# Patient Record
Sex: FEMALE | Race: BLACK OR AFRICAN AMERICAN | NOT HISPANIC OR LATINO | Employment: FULL TIME | URBAN - METROPOLITAN AREA
[De-identification: names, ages, dates, MRNs, and addresses within clinical notes are randomized per-mention and may not be internally consistent; named-entity substitution may affect disease eponyms.]

---

## 2024-02-13 DIAGNOSIS — J45.901 ASTHMA WITH ACUTE EXACERBATION, UNSPECIFIED ASTHMA SEVERITY, UNSPECIFIED WHETHER PERSISTENT (HHS-HCC): Primary | ICD-10-CM

## 2024-02-13 PROBLEM — R22.30 MASS OF FOREARM: Status: RESOLVED | Noted: 2024-02-13 | Resolved: 2024-02-13

## 2024-02-13 PROBLEM — R09.82 POSTNASAL DRIP: Status: RESOLVED | Noted: 2024-02-13 | Resolved: 2024-02-13

## 2024-02-13 PROBLEM — R92.8 ABNORMAL MAMMOGRAM: Status: RESOLVED | Noted: 2024-02-13 | Resolved: 2024-02-13

## 2024-02-13 PROBLEM — E66.9 OBESITY: Status: ACTIVE | Noted: 2024-02-13

## 2024-02-13 PROBLEM — R42 VERTIGO: Status: ACTIVE | Noted: 2024-02-13

## 2024-02-13 PROBLEM — M25.561 RIGHT KNEE PAIN: Status: RESOLVED | Noted: 2024-02-13 | Resolved: 2024-02-13

## 2024-02-13 PROBLEM — R07.89 ANTERIOR CHEST WALL PAIN: Status: RESOLVED | Noted: 2024-02-13 | Resolved: 2024-02-13

## 2024-02-13 PROBLEM — R31.9 HEMATURIA: Status: RESOLVED | Noted: 2024-02-13 | Resolved: 2024-02-13

## 2024-02-13 PROBLEM — E55.9 VITAMIN D DEFICIENCY: Status: ACTIVE | Noted: 2024-02-13

## 2024-02-13 PROBLEM — M81.0 AGE RELATED OSTEOPOROSIS: Status: ACTIVE | Noted: 2024-02-13

## 2024-02-13 PROBLEM — R07.1 CHEST PAIN VARYING WITH BREATHING: Status: RESOLVED | Noted: 2024-02-13 | Resolved: 2024-02-13

## 2024-02-13 PROBLEM — R31.29 MICROSCOPIC HEMATURIA: Status: RESOLVED | Noted: 2024-02-13 | Resolved: 2024-02-13

## 2024-02-13 PROBLEM — E78.00 HYPERCHOLESTEREMIA: Status: ACTIVE | Noted: 2024-02-13

## 2024-02-13 RX ORDER — ALBUTEROL SULFATE 90 UG/1
2 AEROSOL, METERED RESPIRATORY (INHALATION) EVERY 6 HOURS PRN
COMMUNITY
End: 2024-02-13 | Stop reason: SDUPTHER

## 2024-02-13 RX ORDER — BUDESONIDE AND FORMOTEROL FUMARATE DIHYDRATE 160; 4.5 UG/1; UG/1
1 AEROSOL RESPIRATORY (INHALATION) 2 TIMES DAILY
COMMUNITY

## 2024-02-13 RX ORDER — ALBUTEROL SULFATE 90 UG/1
2 AEROSOL, METERED RESPIRATORY (INHALATION) EVERY 6 HOURS PRN
Qty: 18 G | Refills: 2 | Status: SHIPPED | OUTPATIENT
Start: 2024-02-13

## 2024-02-23 ENCOUNTER — OFFICE VISIT (OUTPATIENT)
Dept: PRIMARY CARE | Facility: CLINIC | Age: 69
End: 2024-02-23
Payer: COMMERCIAL

## 2024-02-23 VITALS
HEIGHT: 59 IN | HEART RATE: 72 BPM | WEIGHT: 176 LBS | BODY MASS INDEX: 35.48 KG/M2 | SYSTOLIC BLOOD PRESSURE: 120 MMHG | DIASTOLIC BLOOD PRESSURE: 70 MMHG | RESPIRATION RATE: 16 BRPM

## 2024-02-23 DIAGNOSIS — J98.01 COUGH DUE TO BRONCHOSPASM: Primary | ICD-10-CM

## 2024-02-23 DIAGNOSIS — J45.901 MODERATE ASTHMA WITH ACUTE EXACERBATION, UNSPECIFIED WHETHER PERSISTENT (HHS-HCC): ICD-10-CM

## 2024-02-23 PROCEDURE — 99213 OFFICE O/P EST LOW 20 MIN: CPT | Performed by: INTERNAL MEDICINE

## 2024-02-23 PROCEDURE — 1036F TOBACCO NON-USER: CPT | Performed by: INTERNAL MEDICINE

## 2024-02-23 PROCEDURE — 1159F MED LIST DOCD IN RCRD: CPT | Performed by: INTERNAL MEDICINE

## 2024-02-23 RX ORDER — BENZONATATE 200 MG/1
200 CAPSULE ORAL 3 TIMES DAILY PRN
Qty: 42 CAPSULE | Refills: 0 | Status: SHIPPED | OUTPATIENT
Start: 2024-02-23 | End: 2024-03-24

## 2024-02-23 RX ORDER — PREDNISONE 10 MG/1
TABLET ORAL
Qty: 30 TABLET | Refills: 0 | Status: SHIPPED | OUTPATIENT
Start: 2024-02-23 | End: 2024-03-03

## 2024-02-23 RX ORDER — DOXYCYCLINE 100 MG/1
100 CAPSULE ORAL 2 TIMES DAILY
Qty: 28 CAPSULE | Refills: 0 | Status: SHIPPED | OUTPATIENT
Start: 2024-02-23 | End: 2024-03-08

## 2024-02-23 ASSESSMENT — ENCOUNTER SYMPTOMS
GASTROINTESTINAL NEGATIVE: 1
ENDOCRINE NEGATIVE: 1
CARDIOVASCULAR NEGATIVE: 1
EYES NEGATIVE: 1
COUGH: 1
NEUROLOGICAL NEGATIVE: 1
CONSTITUTIONAL NEGATIVE: 1
MUSCULOSKELETAL NEGATIVE: 1
PSYCHIATRIC NEGATIVE: 1
HEMATOLOGIC/LYMPHATIC NEGATIVE: 1
ALLERGIC/IMMUNOLOGIC NEGATIVE: 1

## 2024-02-23 NOTE — ASSESSMENT & PLAN NOTE
Bronchitis with wheezing                                           Recommend:                                                                                                      mild  Pharyngitis,                                                                                                                                                                               Start:   Doxycycline 100 mg BID for 14 days  -                                                                                                                                                             Asthma/COPD exacerbation       Allegra 180mg qD vs. Claritin 10 mg qD and Mucinex                                                                                                                                              Cough  - start Prednisone 10 mg II BID for 3 days and II qD for 3 days and I qD for 3 days -  Avoid cold exposure and take vitamins C 500 and B complex  qD   and start startDoxycycline 100 mg BID for 14 days   and Allegra 180 mg daily and Flonase I puff BID and avoids cold exposure      Sinusitis - allergic vs. infectious - start Allegra and Flonase I BID and Doxycycline 100 mg BID for 14 days  and avoid cold exposure.

## 2024-02-23 NOTE — PROGRESS NOTES
"Subjective   Patient ID: Aurea Hall is a 68 y.o. female who presents for Cough (Negative covid, dry cough).- cough for one month and she is negative for Covid  - dry cough  -     Cough         Review of Systems   Constitutional: Negative.    HENT: Negative.     Eyes: Negative.    Respiratory:  Positive for cough.    Cardiovascular: Negative.    Gastrointestinal: Negative.    Endocrine: Negative.    Musculoskeletal: Negative.    Skin: Negative.    Allergic/Immunologic: Negative.    Neurological: Negative.    Hematological: Negative.    Psychiatric/Behavioral: Negative.     All other systems reviewed and are negative.      Objective   Ht 1.499 m (4' 11\")   Wt 79.8 kg (176 lb)   BMI 35.55 kg/m²   Blood pressure 120/70, pulse 72, resp. rate 16, height 1.499 m (4' 11\"), weight 79.8 kg (176 lb).   Physical Exam  Vitals and nursing note reviewed.   Constitutional:       Appearance: Normal appearance.   HENT:      Head: Normocephalic and atraumatic.      Right Ear: Tympanic membrane, ear canal and external ear normal.      Left Ear: Tympanic membrane, ear canal and external ear normal. There is no impacted cerumen.      Nose: Nose normal.      Mouth/Throat:      Mouth: Mucous membranes are moist.      Pharynx: Oropharynx is clear.   Eyes:      Extraocular Movements: Extraocular movements intact.      Conjunctiva/sclera: Conjunctivae normal.      Pupils: Pupils are equal, round, and reactive to light.   Cardiovascular:      Rate and Rhythm: Normal rate and regular rhythm.      Pulses: Normal pulses.      Heart sounds: Normal heart sounds. No murmur heard.  Pulmonary:      Effort: Pulmonary effort is normal. No respiratory distress.      Breath sounds: No stridor. Wheezing and rhonchi present. No rales.   Chest:      Chest wall: No tenderness.   Abdominal:      General: Abdomen is flat. Bowel sounds are normal. There is no distension.      Palpations: Abdomen is soft. There is no mass.      Tenderness: There is no " abdominal tenderness. There is no right CVA tenderness, left CVA tenderness, guarding or rebound.      Hernia: No hernia is present.   Musculoskeletal:         General: Normal range of motion.      Cervical back: Normal range of motion and neck supple.   Skin:     General: Skin is warm.      Capillary Refill: Capillary refill takes less than 2 seconds.   Neurological:      General: No focal deficit present.      Mental Status: She is alert.      Cranial Nerves: No cranial nerve deficit.      Sensory: No sensory deficit.      Motor: No weakness.      Coordination: Coordination normal.      Gait: Gait normal.      Deep Tendon Reflexes: Reflexes normal.   Psychiatric:         Mood and Affect: Mood normal.         Behavior: Behavior normal. Behavior is cooperative.         Thought Content: Thought content normal.         Judgment: Judgment normal.         Assessment/Plan   Problem List Items Addressed This Visit             ICD-10-CM    Asthma with exacerbation J45.901     Bronchitis with wheezing                                           Recommend:                                                                                                      mild  Pharyngitis,                                                                                                                                                                               Start:   Doxycycline 100 mg BID for 14 days  -                                                                                                                                                             Asthma/COPD exacerbation       Allegra 180mg qD vs. Claritin 10 mg qD and Mucinex                                                                                                                                             Cough  - start Prednisone 10 mg II BID for 3 days and II qD for 3 days and I qD for 3 days -  Avoid cold exposure and take vitamins C 500 and B complex  qD    and start startDoxycycline 100 mg BID for 14 days   and Allegra 180 mg daily and Flonase I puff BID and avoids cold exposure     Sinusitis - allergic vs. infectious - start Allegra and Flonase I BID and Doxycycline 100 mg BID for 14 days  and avoid cold exposure.           Relevant Medications    doxycycline (Vibramycin) 100 mg capsule    predniSONE (Deltasone) 10 mg tablet    benzonatate (Tessalon) 200 mg capsule    Cough due to bronchospasm - Primary J98.01     Bronchitis with wheezing                                           Recommend:                                                                                                      mild  Pharyngitis,                                                                                                                                                                               Start:   Doxycycline 100 mg BID for 14 days  -                                                                                                                                                             Asthma/COPD exacerbation       Allegra 180mg qD vs. Claritin 10 mg qD and Mucinex                                                                                                                                              Cough  - start Prednisone 10 mg II BID for 3 days and II qD for 3 days and I qD for 3 days -  Avoid cold exposure and take vitamins C 500 and B complex  qD   and start startDoxycycline 100 mg BID for 14 days   and Allegra 180 mg daily and Flonase I puff BID and avoids cold exposure      Sinusitis - allergic vs. infectious - start Allegra and Flonase I BID and Doxycycline 100 mg BID for 14 days  and avoid cold exposure.           Relevant Medications    doxycycline (Vibramycin) 100 mg capsule    predniSONE (Deltasone) 10 mg tablet    benzonatate (Tessalon) 200 mg capsule

## 2024-02-29 ENCOUNTER — TELEPHONE (OUTPATIENT)
Dept: PRIMARY CARE | Facility: CLINIC | Age: 69
End: 2024-02-29

## 2024-02-29 ENCOUNTER — APPOINTMENT (OUTPATIENT)
Dept: PRIMARY CARE | Facility: CLINIC | Age: 69
End: 2024-02-29
Payer: COMMERCIAL

## 2024-02-29 PROBLEM — K11.20 PAROTITIS: Status: RESOLVED | Noted: 2024-02-29 | Resolved: 2024-02-29

## 2024-02-29 PROBLEM — I10 PRIMARY HYPERTENSION: Status: RESOLVED | Noted: 2024-02-29 | Resolved: 2024-02-29

## 2024-02-29 PROBLEM — U07.1 SEVERE ACUTE RESPIRATORY SYNDROME CORONAVIRUS 2 (SARS-COV-2) DETECTED: Status: RESOLVED | Noted: 2024-02-29 | Resolved: 2024-02-29

## 2024-03-01 ENCOUNTER — APPOINTMENT (OUTPATIENT)
Dept: PRIMARY CARE | Facility: CLINIC | Age: 69
End: 2024-03-01
Payer: COMMERCIAL

## 2024-03-13 ENCOUNTER — HOSPITAL ENCOUNTER (OUTPATIENT)
Dept: RADIOLOGY | Facility: HOSPITAL | Age: 69
Discharge: HOME | End: 2024-03-13
Payer: COMMERCIAL

## 2024-03-13 DIAGNOSIS — J98.01 COUGH DUE TO BRONCHOSPASM: ICD-10-CM

## 2024-03-13 PROCEDURE — 71046 X-RAY EXAM CHEST 2 VIEWS: CPT | Performed by: RADIOLOGY

## 2024-03-13 PROCEDURE — 71046 X-RAY EXAM CHEST 2 VIEWS: CPT

## 2024-03-15 ENCOUNTER — TELEPHONE (OUTPATIENT)
Dept: PRIMARY CARE | Facility: CLINIC | Age: 69
End: 2024-03-15
Payer: COMMERCIAL

## 2024-04-01 ENCOUNTER — OFFICE VISIT (OUTPATIENT)
Dept: PRIMARY CARE | Facility: CLINIC | Age: 69
End: 2024-04-01
Payer: COMMERCIAL

## 2024-04-01 VITALS
DIASTOLIC BLOOD PRESSURE: 83 MMHG | WEIGHT: 166.12 LBS | SYSTOLIC BLOOD PRESSURE: 149 MMHG | RESPIRATION RATE: 16 BRPM | HEART RATE: 80 BPM | TEMPERATURE: 97 F | BODY MASS INDEX: 33.49 KG/M2 | HEIGHT: 59 IN | OXYGEN SATURATION: 97 %

## 2024-04-01 DIAGNOSIS — Z01.00 EYE EXAM, ROUTINE: ICD-10-CM

## 2024-04-01 DIAGNOSIS — R09.82 POSTNASAL DRIP: ICD-10-CM

## 2024-04-01 DIAGNOSIS — I10 PRIMARY HYPERTENSION: ICD-10-CM

## 2024-04-01 DIAGNOSIS — Z00.00 ENCOUNTER FOR ANNUAL PHYSICAL EXAM: Primary | ICD-10-CM

## 2024-04-01 DIAGNOSIS — I10 PRIMARY HYPERTENSION: Primary | ICD-10-CM

## 2024-04-01 DIAGNOSIS — Z78.0 POSTMENOPAUSAL: ICD-10-CM

## 2024-04-01 DIAGNOSIS — Z12.31 OTHER SCREENING MAMMOGRAM: ICD-10-CM

## 2024-04-01 DIAGNOSIS — Z12.11 ENCOUNTER FOR SCREENING COLONOSCOPY: ICD-10-CM

## 2024-04-01 PROCEDURE — 1159F MED LIST DOCD IN RCRD: CPT | Performed by: INTERNAL MEDICINE

## 2024-04-01 PROCEDURE — 1036F TOBACCO NON-USER: CPT | Performed by: INTERNAL MEDICINE

## 2024-04-01 PROCEDURE — 3079F DIAST BP 80-89 MM HG: CPT | Performed by: INTERNAL MEDICINE

## 2024-04-01 PROCEDURE — 3077F SYST BP >= 140 MM HG: CPT | Performed by: INTERNAL MEDICINE

## 2024-04-01 PROCEDURE — 99397 PER PM REEVAL EST PAT 65+ YR: CPT | Performed by: INTERNAL MEDICINE

## 2024-04-01 PROCEDURE — 1125F AMNT PAIN NOTED PAIN PRSNT: CPT | Performed by: INTERNAL MEDICINE

## 2024-04-01 RX ORDER — AMLODIPINE BESYLATE 5 MG/1
5 TABLET ORAL DAILY
Qty: 90 TABLET | Refills: 1 | Status: SHIPPED | OUTPATIENT
Start: 2024-04-01

## 2024-04-01 RX ORDER — FLUTICASONE PROPIONATE 50 MCG
1 SPRAY, SUSPENSION (ML) NASAL DAILY
Qty: 16 G | Refills: 11 | Status: SHIPPED | OUTPATIENT
Start: 2024-04-01 | End: 2025-04-01

## 2024-04-01 RX ORDER — AMLODIPINE BESYLATE 5 MG/1
5 TABLET ORAL DAILY
Qty: 30 TABLET | Refills: 5 | Status: SHIPPED | OUTPATIENT
Start: 2024-04-01 | End: 2024-04-01

## 2024-04-01 ASSESSMENT — PAIN SCALES - GENERAL: PAINLEVEL: 6

## 2024-04-01 ASSESSMENT — ENCOUNTER SYMPTOMS: COUGH: 1

## 2024-04-01 NOTE — ASSESSMENT & PLAN NOTE
Declines Prevnar 20 and Adacel vaccine.  Recommend Shingrix vaccine.  Have  screening mammogram.  Have  bone density scan.  Schedule screening colonoscopy.  Have fasting blood work.  She ophthalmologist in consult for eyes exam.

## 2024-04-01 NOTE — PROGRESS NOTES
"Subjective   Patient ID: Aurea Hall is a 68 y.o. female who presents for Knee Pain and Annual Exam.    Annual physical exam.  She has asthma, C/O coughing in the morning, Takes Singulair ,uses inhaler Symbicort. Post nasal drip.  High blood pressure today. Does not follow low sodium diet.  BMI 33.    Knee Pain          Review of Systems   Respiratory:  Positive for cough.    All other systems reviewed and are negative.      Objective   /83 (BP Location: Left arm, Patient Position: Sitting)   Pulse 80   Temp 36.1 °C (97 °F) (Temporal)   Resp 16   Ht 1.49 m (4' 10.66\")   Wt 75.4 kg (166 lb 1.9 oz)   SpO2 97%   BMI 33.94 kg/m²     Physical Exam  Constitutional:       Appearance: Normal appearance. She is obese.   HENT:      Head: Normocephalic and atraumatic.      Right Ear: External ear normal.      Left Ear: External ear normal.      Mouth/Throat:      Mouth: Mucous membranes are moist.      Pharynx: Oropharynx is clear.   Neck:      Vascular: No carotid bruit.   Cardiovascular:      Rate and Rhythm: Normal rate and regular rhythm.      Heart sounds: No murmur heard.     No gallop.   Pulmonary:      Effort: Pulmonary effort is normal. No respiratory distress.      Breath sounds: No wheezing or rales.   Chest:   Breasts:     Right: Normal.      Left: Normal.   Abdominal:      General: Abdomen is flat.      Palpations: Abdomen is soft.      Hernia: No hernia is present.   Musculoskeletal:         General: No swelling, tenderness, deformity or signs of injury.      Cervical back: No rigidity or tenderness.      Right lower leg: No edema.   Lymphadenopathy:      Cervical: No cervical adenopathy.   Skin:     Coloration: Skin is not jaundiced or pale.      Findings: No bruising, erythema, lesion or rash.   Neurological:      General: No focal deficit present.      Mental Status: She is oriented to person, place, and time.      Motor: No weakness.      Coordination: Coordination normal.   Psychiatric:    "      Mood and Affect: Mood normal.         Behavior: Behavior normal.         Assessment/Plan   Problem List Items Addressed This Visit             ICD-10-CM    Postnasal drip R09.82     Use fluticasone nasal spray 1 spray each nostril at bedtime.  May take Allegra or Claritin at bedtime         Relevant Medications    fluticasone (Flonase) 50 mcg/actuation nasal spray    Primary hypertension I10     Advise low-sodium diet do not exceed 200 mg per serving.  Start amlodipine 5 mg daily.         Relevant Medications    amLODIPine (Norvasc) 5 mg tablet    Encounter for annual physical exam - Primary Z00.00     Declines Prevnar 20 and Adacel vaccine.  Her screening mammogram.  Her bone density scan.  Schedule screening colonoscopy.  Her fasting blood work.  She ophthalmologist in consult for eyes exam.          Other Visit Diagnoses         Codes    Eye exam, routine     Z01.00    Relevant Orders    Referral to Ophthalmology    Other screening mammogram     Z12.31    Relevant Orders    BI mammo bilateral screening tomosynthesis    Postmenopausal     Z78.0    Relevant Orders    XR DEXA bone density    Encounter for screening colonoscopy     Z12.11    Relevant Orders    Colonoscopy Screening; Average Risk Patient

## 2024-04-01 NOTE — ASSESSMENT & PLAN NOTE
Use fluticasone nasal spray 1 spray each nostril at bedtime.  May take Allegra or Claritin at bedtime

## 2024-04-11 ENCOUNTER — HOSPITAL ENCOUNTER (OUTPATIENT)
Dept: RADIOLOGY | Facility: CLINIC | Age: 69
Discharge: HOME | End: 2024-04-11
Payer: COMMERCIAL

## 2024-04-11 VITALS — HEIGHT: 59 IN | WEIGHT: 166.23 LBS | BODY MASS INDEX: 33.51 KG/M2

## 2024-04-11 DIAGNOSIS — Z12.31 OTHER SCREENING MAMMOGRAM: ICD-10-CM

## 2024-04-11 PROCEDURE — 77067 SCR MAMMO BI INCL CAD: CPT

## 2024-04-11 PROCEDURE — 77063 BREAST TOMOSYNTHESIS BI: CPT | Performed by: STUDENT IN AN ORGANIZED HEALTH CARE EDUCATION/TRAINING PROGRAM

## 2024-04-11 PROCEDURE — 77067 SCR MAMMO BI INCL CAD: CPT | Performed by: STUDENT IN AN ORGANIZED HEALTH CARE EDUCATION/TRAINING PROGRAM

## 2024-04-16 DIAGNOSIS — R92.8 ABNORMAL MAMMOGRAM OF LEFT BREAST: Primary | ICD-10-CM

## 2024-04-17 ENCOUNTER — TELEPHONE (OUTPATIENT)
Dept: PRIMARY CARE | Facility: CLINIC | Age: 69
End: 2024-04-17
Payer: COMMERCIAL

## 2024-04-18 ENCOUNTER — PROCEDURE VISIT (OUTPATIENT)
Dept: SURGICAL ONCOLOGY | Facility: CLINIC | Age: 69
End: 2024-04-18
Payer: COMMERCIAL

## 2024-04-18 ENCOUNTER — HOSPITAL ENCOUNTER (OUTPATIENT)
Dept: RADIOLOGY | Facility: CLINIC | Age: 69
Discharge: HOME | End: 2024-04-18
Payer: COMMERCIAL

## 2024-04-18 VITALS
DIASTOLIC BLOOD PRESSURE: 88 MMHG | BODY MASS INDEX: 32.79 KG/M2 | HEIGHT: 60 IN | WEIGHT: 167 LBS | SYSTOLIC BLOOD PRESSURE: 138 MMHG | HEART RATE: 67 BPM

## 2024-04-18 DIAGNOSIS — R92.8 ABNORMAL FINDING ON BREAST IMAGING: Primary | ICD-10-CM

## 2024-04-18 DIAGNOSIS — R92.8 ABNORMAL MAMMOGRAM OF LEFT BREAST: ICD-10-CM

## 2024-04-18 DIAGNOSIS — N63.20 MASS OF LEFT BREAST, UNSPECIFIED QUADRANT: ICD-10-CM

## 2024-04-18 PROCEDURE — 99203 OFFICE O/P NEW LOW 30 MIN: CPT

## 2024-04-18 PROCEDURE — 76642 ULTRASOUND BREAST LIMITED: CPT | Mod: LT

## 2024-04-18 PROCEDURE — 99213 OFFICE O/P EST LOW 20 MIN: CPT

## 2024-04-18 PROCEDURE — 76642 ULTRASOUND BREAST LIMITED: CPT | Mod: LEFT SIDE | Performed by: STUDENT IN AN ORGANIZED HEALTH CARE EDUCATION/TRAINING PROGRAM

## 2024-04-18 PROCEDURE — 76982 USE 1ST TARGET LESION: CPT | Mod: LT

## 2024-04-18 ASSESSMENT — PAIN SCALES - GENERAL: PAINLEVEL: 0-NO PAIN

## 2024-04-18 NOTE — PATIENT INSTRUCTIONS
I recommend a left breast ultrasound guided biopsy. A breast radiology physician will perform the biopsy. Possible diagnoses include benign, atypical, or cancer as we discussed.  Bruising and mild discomfort after the biopsy is normal and will improve. I normally have results in 5-7 business days. I will call you with results, please have your phone handy to take my call.    IMPORTANT INFORMATION REGARDING YOUR RESULTS    If you receive medical information from My [facility] Personal Health Record (online chart) your results will be released into your chart. This means you may view or see results of your biopsy or procedure before I contact you directly. If this occurs, please call the office and we will discuss your results over the phone.    You can see your health information, review clinical summaries from office visits & test results online when you follow your health with MY  Chart, a personal health record. To sign up go to [email or web address]. If you need assistance with signing up or trouble getting into your account call [facility] Patient Line 24/7 at [phone].    My office phone number is [phone] if you need to get in touch with me or have additional questions or concerns. Thank you for choosing [facility] and trusting me as your healthcare provider. I look forward to seeing you again at your next office visit. I am honored to be a provider on your health care team and I remain dedicated to helping you achieve your health goals.Proceed to biopsy. A breast radiology physician will perform the biopsy. Results are usually available in about 7 business days. I will call patient with results and instruct on next steps and plan.

## 2024-04-18 NOTE — PROGRESS NOTES
[facility]  Aurea Hall female   1955 68 y.o.  [id number]      Chief Complaint  New patient, biopsy consultation.    History Of Present Illness  Aurea Hall is a 68 y.o. female seen in the breast center for biopsy consultation. She denies breast surgery or biopsy. She has family history of breast cancer in paternal grandmother    BREAST IMAGIN2024 Bilateral screening mammogram, BI-RADS Category 0, Left breast masses. 2024 Left breast ultrasound, BI-RADS Category 4, 3:00 8 cm from the nipple, 1.5 x 2.0 x 1.0 cm, heterogeneous oval parallel circumscribed conglomerate masses, left axilla demonstrates 4  morphologically normal lymph nodes, warranting ultrasound guided biopsy.     REPRODUCTIVE HISTORY: menarche age 9, , no OCP's, menopause age unknown s/p KI in  due to fibroids, heterogeneously dense     FAMILY CANCER HISTORY:   Father: Colon cancer, age 68  Paternal Grandmother: breast cancer, age 66  Brother: unknown primary     Review of Systems  Constitutional:  Negative for appetite change, fatigue, fever and unexpected weight change.   HENT:  Negative for ear pain, hearing loss, nosebleeds, sore throat and trouble swallowing.    Eyes:  Negative for discharge, itching and visual disturbance.   Breast: As stated in HPI.  Respiratory:  Negative for cough, chest tightness and shortness of breath.    Cardiovascular:  Negative for chest pain, palpitations and leg swelling.   Gastrointestinal:  Negative for abdominal pain, constipation, diarrhea and nausea.   Endocrine: Negative for cold intolerance and heat intolerance.   Genitourinary:  Negative for dysuria, frequency, hematuria, pelvic pain and vaginal bleeding.   Musculoskeletal:  Negative for arthralgias, back pain, gait problem, joint swelling and myalgias.   Skin:  Negative for color change and rash.   Allergic/Immunologic: Negative for environmental allergies and food allergies.   Neurological:  Negative  for dizziness, tremors, speech difficulty, weakness, numbness and headaches.   Hematological:  Does not bruise/bleed easily.   Psychiatric/Behavioral:  Negative for agitation, dysphoric mood and sleep disturbance. The patient is not nervous/anxious.       Past Medical History  She has a past medical history of Abnormal mammogram (02/13/2024), Anterior chest wall pain (02/13/2024), Mass of forearm (02/13/2024), Microscopic hematuria (02/13/2024), Parotitis (02/29/2024), Personal history of other diseases of urinary system (07/22/2014), Postnasal drip (02/13/2024), Primary hypertension (02/29/2024), Right knee pain (02/13/2024), Severe acute respiratory syndrome coronavirus 2 (SARS-CoV-2) detected (02/29/2024), and Unspecified asthma, uncomplicated (HHS-HCC) (04/07/2020).    Surgical History  She has a past surgical history that includes Hysterectomy (03/11/2014).    Family History  Cancer-related family history includes Breast cancer (age of onset: 60) in her paternal grandmother.     Social History  She reports that she has quit smoking. Her smoking use included cigarettes. She has never used smokeless tobacco. She reports that she does not currently use alcohol. She reports that she does not use drugs.    Allergies  Doxycycline, Erythromycin base, and Sulfamethoxazole-trimethoprim    Medications  Current Outpatient Medications   Medication Instructions    albuterol 90 mcg/actuation inhaler 2 puffs, inhalation, Every 6 hours PRN    amLODIPine (NORVASC) 5 mg, oral, Daily    budesonide-formoteroL (Symbicort) 160-4.5 mcg/actuation inhaler 1 puff, inhalation, 2 times daily    fluticasone (Flonase) 50 mcg/actuation nasal spray 1 spray, Each Nostril, Daily, Shake gently. Before first use, prime pump. After use, clean tip and replace cap.    montelukast (SINGULAIR) 10 mg, oral, Daily       Last Recorded Vitals  There were no vitals filed for this visit.    Physical Exam  Physical Exam  Patient is alert and oriented x3 and  in a relaxed and appropriate mood. Her gait is steady and hand grasps are equal. Sclera is clear. The breasts are nearly symmetrical. The left breast 3:00 8 cm from the nipple,  1.5 x 1.5 cm mass. The tissue is soft without palpable abnormalities, discrete nodules or masses. The skin and nipples appear normal. There is no cervical, supraclavicular or axillary lymphadenopathy. Heart rate and rhythm normal, S1 and S2 appreciated. The lungs are clear to auscultation bilaterally. Abdomen is soft and non-tender.      Relevant Results and Imaging      BI US breast limited left 04/18/2024    Narrative  Interpreted By:  Sami Arrieta,  STUDY:  BI US BREAST LIMITED LEFT;  4/18/2024 9:06 am    ACCESSION NUMBER(S):  [id number]    ORDERING CLINICIAN:  JLUIS BALLARD    INDICATION:  Recall from screening for slowly growing left breast masses.    COMPARISON:  04/11/2024, 11/19/2021, 05/19/2017    FINDINGS:  Targeted ultrasound was performed of the left breast by a registered  sonographer with elastography. In the left breast at 3 o'clock, 8 cm  from the nipple, there is a heterogeneous oval parallel circumscribed  conglomerate masses measuring 1.5 x 2.0 x 1.0 cm. This contains some  internal flow and is intermediate on elastography. Given the slow  interval growth on mammography, biopsy is recommended for further  evaluation. Scanning of the left axilla demonstrates 4  morphologically normal lymph nodes.    Impression  Indeterminate conglomerate of left breast masses. Further evaluation  with surgical consultation and ultrasound-guided biopsy is  recommended. Dr. Sami Arrieta explained the findings and  recommendations to the patient at the time of exam. A message was  sent to the referring practitioner at the time of this dictation  regarding these findings using the epic critical findings reporting  system. A pre-procedure form was filled out.    Method of Detection: Category Sdbt - 3D Screening    BI-RADS CATEGORY:    BI-RADS  Category:  4 Suspicious.  Recommendation:  Surgical Consultation and Biopsy.  Recommended Date:  Immediate.  Laterality:  Left.      I explained the results in depth, along with suggested explanation for follow up recommendations based on the testing results. BI-RADS Category 4    Visit Diagnosis  No diagnosis found.      Assessment/Plan    Proceed to biopsy. A breast radiology physician will perform the biopsy. Results are usually available in about 7 business days. I will call patient with results and instruct on next steps and plan.        Patient Discussion/Summary   I recommend a left breast ultrasound guided biopsy. A breast radiology physician will perform the biopsy. Possible diagnoses include benign, atypical, or cancer as we discussed.  Bruising and mild discomfort after the biopsy is normal and will improve. I normally have results in 5-7 business days. I will call you with results, please have your phone handy to take my call.    IMPORTANT INFORMATION REGARDING YOUR RESULTS    If you receive medical information from My [facility] Personal Health Record (online chart) your results will be released into your chart. This means you may view or see results of your biopsy or procedure before I contact you directly. If this occurs, please call the office and we will discuss your results over the phone.    You can see your health information, review clinical summaries from office visits & test results online when you follow your health with MY  Chart, a personal health record. To sign up go to [email or web address]. If you need assistance with signing up or trouble getting into your account call [facility] Patient Line 24/7 at [phone].    My office phone number is [phone] if you need to get in touch with me or have additional questions or concerns. Thank you for choosing [facility] and trusting me as your healthcare provider. I look forward to seeing you again at your next office visit. I  am honored to be a provider on your health care team and I remain dedicated to helping you achieve your health goals.Proceed to biopsy. A breast radiology physician will perform the biopsy. Results are usually available in about 7 business days. I will call patient with results and instruct on next steps and plan.       Carleen Jane, APRN-CNP

## 2024-04-23 ENCOUNTER — APPOINTMENT (OUTPATIENT)
Dept: RADIOLOGY | Facility: CLINIC | Age: 69
End: 2024-04-23
Payer: COMMERCIAL

## 2024-04-30 ENCOUNTER — APPOINTMENT (OUTPATIENT)
Dept: PRIMARY CARE | Facility: CLINIC | Age: 69
End: 2024-04-30
Payer: COMMERCIAL

## 2024-05-07 ENCOUNTER — APPOINTMENT (OUTPATIENT)
Dept: PRIMARY CARE | Facility: CLINIC | Age: 69
End: 2024-05-07
Payer: COMMERCIAL

## 2024-05-13 ENCOUNTER — TELEPHONE (OUTPATIENT)
Dept: RADIOLOGY | Facility: CLINIC | Age: 69
End: 2024-05-13
Payer: COMMERCIAL

## 2024-05-13 DIAGNOSIS — R92.8 ABNORMAL FINDING ON BREAST IMAGING: Primary | ICD-10-CM

## 2024-05-16 ENCOUNTER — OFFICE VISIT (OUTPATIENT)
Dept: PRIMARY CARE | Facility: CLINIC | Age: 69
End: 2024-05-16
Payer: COMMERCIAL

## 2024-05-16 VITALS
HEART RATE: 86 BPM | RESPIRATION RATE: 16 BRPM | WEIGHT: 168 LBS | BODY MASS INDEX: 32.81 KG/M2 | SYSTOLIC BLOOD PRESSURE: 130 MMHG | DIASTOLIC BLOOD PRESSURE: 78 MMHG | OXYGEN SATURATION: 95 %

## 2024-05-16 DIAGNOSIS — M17.0 LOCALIZED OSTEOARTHRITIS OF KNEES, BILATERAL: ICD-10-CM

## 2024-05-16 DIAGNOSIS — E78.00 HYPERCHOLESTEREMIA: ICD-10-CM

## 2024-05-16 DIAGNOSIS — I10 PRIMARY HYPERTENSION: Primary | ICD-10-CM

## 2024-05-16 PROCEDURE — 1036F TOBACCO NON-USER: CPT | Performed by: INTERNAL MEDICINE

## 2024-05-16 PROCEDURE — 1160F RVW MEDS BY RX/DR IN RCRD: CPT | Performed by: INTERNAL MEDICINE

## 2024-05-16 PROCEDURE — 3075F SYST BP GE 130 - 139MM HG: CPT | Performed by: INTERNAL MEDICINE

## 2024-05-16 PROCEDURE — 1159F MED LIST DOCD IN RCRD: CPT | Performed by: INTERNAL MEDICINE

## 2024-05-16 PROCEDURE — 3078F DIAST BP <80 MM HG: CPT | Performed by: INTERNAL MEDICINE

## 2024-05-16 PROCEDURE — 99214 OFFICE O/P EST MOD 30 MIN: CPT | Performed by: INTERNAL MEDICINE

## 2024-05-16 ASSESSMENT — ENCOUNTER SYMPTOMS
DEPRESSION: 0
OCCASIONAL FEELINGS OF UNSTEADINESS: 1
ARTHRALGIAS: 1
LOSS OF SENSATION IN FEET: 0

## 2024-05-16 ASSESSMENT — PATIENT HEALTH QUESTIONNAIRE - PHQ9
2. FEELING DOWN, DEPRESSED OR HOPELESS: NOT AT ALL
SUM OF ALL RESPONSES TO PHQ9 QUESTIONS 1 AND 2: 0
1. LITTLE INTEREST OR PLEASURE IN DOING THINGS: NOT AT ALL

## 2024-05-17 NOTE — PROGRESS NOTES
Subjective   Patient ID: Aurea Hall is a 68 y.o. female who presents for No chief complaint on file..    Follow-up visit.  She has asthma, hypertension, chronic bilateral hands and knees pain due to osteoarthritis.  Occasionally she has arthritis pain flares up and is difficult for her to perform her job requires a lot of walking, use of her hands for strenuous tasks.  At  the last visit blood pressure was high ,started on amlodipine 5 mg daily.  Did not pick it up from the pharmacy.       Review of Systems   Musculoskeletal:  Positive for arthralgias.   All other systems reviewed and are negative.      Objective   /78 (BP Location: Right arm, Patient Position: Sitting)   Pulse 86   Resp 16   Wt 76.2 kg (168 lb)   SpO2 95%   BMI 32.81 kg/m²     Physical Exam  Constitutional:       Appearance: Normal appearance.   HENT:      Head: Normocephalic and atraumatic.      Right Ear: External ear normal.      Left Ear: External ear normal.      Mouth/Throat:      Mouth: Mucous membranes are moist.      Pharynx: Oropharynx is clear.   Neck:      Vascular: No carotid bruit.   Cardiovascular:      Rate and Rhythm: Normal rate and regular rhythm.      Heart sounds: No murmur heard.     No gallop.   Pulmonary:      Effort: Pulmonary effort is normal. No respiratory distress.      Breath sounds: No wheezing or rales.   Abdominal:      General: Abdomen is flat.      Palpations: Abdomen is soft.      Hernia: No hernia is present.   Musculoskeletal:         General: Deformity present. No swelling, tenderness or signs of injury.      Cervical back: No rigidity or tenderness.      Right lower leg: No edema.      Comments: Right ring finger osteoarthritis deformity.  Crepitus bilateral knees with range of motion.   Lymphadenopathy:      Cervical: No cervical adenopathy.   Skin:     Coloration: Skin is not jaundiced or pale.      Findings: No bruising, erythema, lesion or rash.   Neurological:      General: No focal  deficit present.      Mental Status: She is oriented to person, place, and time.      Motor: No weakness.      Coordination: Coordination normal.   Psychiatric:         Mood and Affect: Mood normal.         Behavior: Behavior normal.       Assessment/Plan   Problem List Items Addressed This Visit             ICD-10-CM    Hypercholesteremia E78.00     Hypercholesterolemia: check lipid profile.   Educate low cholesterol diet , avoid fast foods , processed meats and fried foods, red meat.  Increase physical activity.  Keep a low carb diet.             Primary hypertension - Primary I10     Start amlodipine 5 mg daily.  Purchase a blood pressure monitor and check blood pressure at home.  Call if readings are higher than 140/90.         Localized osteoarthritis of knees, bilateral M17.0     Difficulty performing her job when she has arthritis flares up   FMLA form filled out.

## 2024-07-11 ENCOUNTER — HOSPITAL ENCOUNTER (OUTPATIENT)
Dept: RADIOLOGY | Facility: CLINIC | Age: 69
End: 2024-07-11
Payer: COMMERCIAL

## 2024-07-17 ENCOUNTER — APPOINTMENT (OUTPATIENT)
Dept: RADIOLOGY | Facility: CLINIC | Age: 69
End: 2024-07-17
Payer: COMMERCIAL

## 2024-08-01 DIAGNOSIS — J30.9 ALLERGIC RHINITIS, UNSPECIFIED SEASONALITY, UNSPECIFIED TRIGGER: ICD-10-CM

## 2024-08-01 RX ORDER — MONTELUKAST SODIUM 10 MG/1
10 TABLET ORAL DAILY
Qty: 90 TABLET | Refills: 1 | Status: SHIPPED | OUTPATIENT
Start: 2024-08-01

## 2024-10-16 ENCOUNTER — APPOINTMENT (OUTPATIENT)
Dept: RADIOLOGY | Facility: CLINIC | Age: 69
End: 2024-10-16
Payer: COMMERCIAL

## 2024-10-28 DIAGNOSIS — J45.901 ASTHMA WITH ACUTE EXACERBATION, UNSPECIFIED ASTHMA SEVERITY, UNSPECIFIED WHETHER PERSISTENT (HHS-HCC): Primary | ICD-10-CM

## 2024-10-28 RX ORDER — BUDESONIDE AND FORMOTEROL FUMARATE DIHYDRATE 160; 4.5 UG/1; UG/1
1 AEROSOL RESPIRATORY (INHALATION) 2 TIMES DAILY
Qty: 10.2 G | Refills: 0 | Status: SHIPPED | OUTPATIENT
Start: 2024-10-28

## 2024-11-06 ENCOUNTER — HOSPITAL ENCOUNTER (OUTPATIENT)
Dept: RADIOLOGY | Facility: CLINIC | Age: 69
Discharge: HOME | End: 2024-11-06
Payer: COMMERCIAL

## 2024-11-06 DIAGNOSIS — Z78.0 POSTMENOPAUSAL: ICD-10-CM

## 2024-11-06 PROCEDURE — 77080 DXA BONE DENSITY AXIAL: CPT

## 2024-11-06 PROCEDURE — 77080 DXA BONE DENSITY AXIAL: CPT | Performed by: RADIOLOGY

## 2024-11-09 DIAGNOSIS — J30.9 ALLERGIC RHINITIS, UNSPECIFIED SEASONALITY, UNSPECIFIED TRIGGER: ICD-10-CM

## 2024-11-11 RX ORDER — MONTELUKAST SODIUM 10 MG/1
10 TABLET ORAL DAILY
Qty: 90 TABLET | Refills: 1 | Status: SHIPPED | OUTPATIENT
Start: 2024-11-11

## 2024-12-10 ENCOUNTER — APPOINTMENT (OUTPATIENT)
Dept: PRIMARY CARE | Facility: CLINIC | Age: 69
End: 2024-12-10
Payer: COMMERCIAL

## 2025-01-06 DIAGNOSIS — J45.901 ASTHMA WITH ACUTE EXACERBATION, UNSPECIFIED ASTHMA SEVERITY, UNSPECIFIED WHETHER PERSISTENT (HHS-HCC): ICD-10-CM

## 2025-01-06 RX ORDER — BUDESONIDE AND FORMOTEROL FUMARATE DIHYDRATE 160; 4.5 UG/1; UG/1
1 AEROSOL RESPIRATORY (INHALATION) 2 TIMES DAILY
Qty: 10.2 G | Refills: 3 | Status: SHIPPED | OUTPATIENT
Start: 2025-01-06

## 2025-07-16 ENCOUNTER — TELEPHONE (OUTPATIENT)
Dept: PRIMARY CARE | Facility: CLINIC | Age: 70
End: 2025-07-16

## 2025-07-16 ENCOUNTER — APPOINTMENT (OUTPATIENT)
Dept: PRIMARY CARE | Facility: CLINIC | Age: 70
End: 2025-07-16
Payer: COMMERCIAL

## 2025-07-16 DIAGNOSIS — J45.901 ASTHMA WITH ACUTE EXACERBATION, UNSPECIFIED ASTHMA SEVERITY, UNSPECIFIED WHETHER PERSISTENT (HHS-HCC): ICD-10-CM

## 2025-07-16 RX ORDER — BUDESONIDE AND FORMOTEROL FUMARATE DIHYDRATE 160; 4.5 UG/1; UG/1
1 AEROSOL RESPIRATORY (INHALATION) 2 TIMES DAILY
Qty: 10.2 G | Refills: 3 | Status: SHIPPED | OUTPATIENT
Start: 2025-07-16

## 2025-07-24 ENCOUNTER — APPOINTMENT (OUTPATIENT)
Dept: PRIMARY CARE | Facility: CLINIC | Age: 70
End: 2025-07-24
Payer: COMMERCIAL

## 2025-07-24 VITALS
SYSTOLIC BLOOD PRESSURE: 140 MMHG | HEART RATE: 77 BPM | RESPIRATION RATE: 17 BRPM | BODY MASS INDEX: 34 KG/M2 | HEIGHT: 59 IN | DIASTOLIC BLOOD PRESSURE: 80 MMHG | OXYGEN SATURATION: 95 % | WEIGHT: 168.65 LBS

## 2025-07-24 DIAGNOSIS — I10 PRIMARY HYPERTENSION: ICD-10-CM

## 2025-07-24 DIAGNOSIS — Z12.12 SCREENING FOR COLORECTAL CANCER: ICD-10-CM

## 2025-07-24 DIAGNOSIS — E55.9 VITAMIN D DEFICIENCY: ICD-10-CM

## 2025-07-24 DIAGNOSIS — Z12.11 SCREENING FOR COLORECTAL CANCER: ICD-10-CM

## 2025-07-24 DIAGNOSIS — Z00.00 ENCOUNTER FOR ANNUAL PHYSICAL EXAM: Primary | ICD-10-CM

## 2025-07-24 DIAGNOSIS — Z80.0 FAMILY HISTORY OF COLON CANCER: ICD-10-CM

## 2025-07-24 DIAGNOSIS — E78.00 HYPERCHOLESTEREMIA: ICD-10-CM

## 2025-07-24 DIAGNOSIS — Z12.31 ENCOUNTER FOR SCREENING MAMMOGRAM FOR BREAST CANCER: ICD-10-CM

## 2025-07-24 DIAGNOSIS — Z00.00 BLOOD TESTS FOR ROUTINE GENERAL PHYSICAL EXAMINATION: ICD-10-CM

## 2025-07-24 DIAGNOSIS — J30.9 ALLERGIC RHINITIS, UNSPECIFIED SEASONALITY, UNSPECIFIED TRIGGER: ICD-10-CM

## 2025-07-24 PROCEDURE — 1126F AMNT PAIN NOTED NONE PRSNT: CPT | Performed by: INTERNAL MEDICINE

## 2025-07-24 PROCEDURE — 3079F DIAST BP 80-89 MM HG: CPT | Performed by: INTERNAL MEDICINE

## 2025-07-24 PROCEDURE — 1170F FXNL STATUS ASSESSED: CPT | Performed by: INTERNAL MEDICINE

## 2025-07-24 PROCEDURE — 3008F BODY MASS INDEX DOCD: CPT | Performed by: INTERNAL MEDICINE

## 2025-07-24 PROCEDURE — 1159F MED LIST DOCD IN RCRD: CPT | Performed by: INTERNAL MEDICINE

## 2025-07-24 PROCEDURE — 3077F SYST BP >= 140 MM HG: CPT | Performed by: INTERNAL MEDICINE

## 2025-07-24 PROCEDURE — 99397 PER PM REEVAL EST PAT 65+ YR: CPT | Performed by: INTERNAL MEDICINE

## 2025-07-24 RX ORDER — MONTELUKAST SODIUM 10 MG/1
10 TABLET ORAL DAILY
Qty: 90 TABLET | Refills: 1 | Status: SHIPPED | OUTPATIENT
Start: 2025-07-24

## 2025-07-24 ASSESSMENT — ENCOUNTER SYMPTOMS
ARTHRALGIAS: 0
POLYPHAGIA: 0
DIAPHORESIS: 0
STRIDOR: 0
COUGH: 0
HEMATURIA: 0
PHOTOPHOBIA: 0
ADENOPATHY: 0
DEPRESSION: 0
TREMORS: 0
FATIGUE: 0
DYSURIA: 0
HEADACHES: 0
CHILLS: 0
NUMBNESS: 0
DIZZINESS: 0
FLANK PAIN: 0
SEIZURES: 0
SPEECH DIFFICULTY: 0
WEAKNESS: 0
PALPITATIONS: 0
APPETITE CHANGE: 0
LOSS OF SENSATION IN FEET: 0
OCCASIONAL FEELINGS OF UNSTEADINESS: 0
BRUISES/BLEEDS EASILY: 0
BACK PAIN: 0

## 2025-07-24 ASSESSMENT — ACTIVITIES OF DAILY LIVING (ADL)
TAKING_MEDICATION: INDEPENDENT
DRESSING: INDEPENDENT
DOING_HOUSEWORK: INDEPENDENT
BATHING: INDEPENDENT
GROCERY_SHOPPING: INDEPENDENT
MANAGING_FINANCES: INDEPENDENT

## 2025-07-24 ASSESSMENT — PATIENT HEALTH QUESTIONNAIRE - PHQ9
1. LITTLE INTEREST OR PLEASURE IN DOING THINGS: NOT AT ALL
2. FEELING DOWN, DEPRESSED OR HOPELESS: NOT AT ALL
SUM OF ALL RESPONSES TO PHQ9 QUESTIONS 1 AND 2: 0

## 2025-07-24 ASSESSMENT — PAIN SCALES - GENERAL: PAINLEVEL_OUTOF10: 0-NO PAIN

## 2025-07-25 NOTE — PROGRESS NOTES
"Subjective   Patient ID: Aurea Hall is a 70 y.o. female who presents for Annual Exam.    Annual physical exam.  She has untreated hypertension and her last visit in 2024 was prescribed amlodipine but never picked up the medication from the pharmacy.  Has history of asthma.  Last year left breast mammogram has had an abnormality and was recommended to have biopsy but she declined.  Declined colonoscopy in the past despite having positive family history of colon cancer, her father .  last time she has had blood work was 2022.           Review of Systems   Constitutional:  Negative for appetite change, chills, diaphoresis and fatigue.   HENT:  Negative for congestion, ear discharge, hearing loss, mouth sores and postnasal drip.    Eyes:  Negative for photophobia and visual disturbance.   Respiratory:  Negative for cough and stridor.    Cardiovascular:  Negative for palpitations and leg swelling.   Endocrine: Negative for cold intolerance, heat intolerance, polyphagia and polyuria.   Genitourinary:  Negative for decreased urine volume, dysuria, enuresis, flank pain and hematuria.   Musculoskeletal:  Negative for arthralgias, back pain and gait problem.   Allergic/Immunologic: Negative for food allergies and immunocompromised state.   Neurological:  Negative for dizziness, tremors, seizures, syncope, speech difficulty, weakness, numbness and headaches.   Hematological:  Negative for adenopathy. Does not bruise/bleed easily.       Objective   /86 (BP Location: Left arm, Patient Position: Sitting)   Pulse 77   Resp 17   Ht (!) 1.492 m (4' 10.74\")   Wt 76.5 kg (168 lb 10.4 oz)   SpO2 95%   BMI 34.37 kg/m²     Physical Exam  Constitutional:       Comments: Overweight.   HENT:      Head: Normocephalic.     Eyes:      Extraocular Movements: Extraocular movements intact.       Cardiovascular:      Rate and Rhythm: Regular rhythm.      Heart sounds: Normal heart sounds.   Pulmonary:      Breath sounds: Normal " breath sounds.   Chest:   Breasts:     Right: Normal.      Left: Normal.   Abdominal:      Palpations: Abdomen is soft.     Musculoskeletal:         General: Normal range of motion.     Skin:     General: Skin is warm.     Neurological:      General: No focal deficit present.      Mental Status: She is oriented to person, place, and time.     Psychiatric:         Mood and Affect: Mood normal.         Assessment/Plan   Problem List Items Addressed This Visit           ICD-10-CM    Hypercholesteremia E78.00    Relevant Orders    Lipid panel    Vitamin D deficiency E55.9    Relevant Orders    Vitamin D 25-Hydroxy,Total (for eval of Vitamin D levels)    Primary hypertension I10    Relevant Orders    Comprehensive metabolic panel    Encounter for annual physical exam - Primary Z00.00    She declines vaccines.  Advised to have a living will and healthcare power of .  Follow-up mammogram in 2024 she declined biopsy.  Health screening mammogram this year.  Last bone density scan November 2024.  She declined colonoscopies in the past, encouraged her to have colonoscopy for detection of colon cancer, her father has had colon cancer.  She agreed to have colonoscopy only if Cologuard is positive.  Referral made to have Cologuard test.  Discussed weight loss, follow low caloric diet avoid rice potatoes pasta sweets.  Increase physical activity.  Have fasting blood work.  Follow-up in 6 months.          Other Visit Diagnoses         Codes      Encounter for screening mammogram for breast cancer     Z12.31    Relevant Orders    BI mammo bilateral screening tomosynthesis      Blood tests for routine general physical examination     Z00.00    Relevant Orders    CBC and Auto Differential      Allergic rhinitis, unspecified seasonality, unspecified trigger     J30.9    Relevant Medications    montelukast (Singulair) 10 mg tablet      Screening for colorectal cancer     Z12.11, Z12.12    Relevant Orders    Cologuard® colon  cancer screening      Family history of colon cancer     Z80.0    Relevant Orders    Cologuard® colon cancer screening

## 2025-07-25 NOTE — ASSESSMENT & PLAN NOTE
She declines vaccines.  Advised to have a living will and healthcare power of .  Follow-up mammogram in 2024 she declined biopsy.  Health screening mammogram this year.  Last bone density scan November 2024.  She declined colonoscopies in the past, encouraged her to have colonoscopy for detection of colon cancer, her father has had colon cancer.  She agreed to have colonoscopy only if Cologuard is positive.  Referral made to have Cologuard test.  Discussed weight loss, follow low caloric diet avoid rice potatoes pasta sweets.  Increase physical activity.  Have fasting blood work.  Follow-up in 6 months.

## 2025-08-12 ENCOUNTER — HOSPITAL ENCOUNTER (OUTPATIENT)
Dept: RADIOLOGY | Facility: CLINIC | Age: 70
Discharge: HOME | End: 2025-08-12
Payer: COMMERCIAL

## 2025-08-12 DIAGNOSIS — Z12.31 ENCOUNTER FOR SCREENING MAMMOGRAM FOR BREAST CANCER: ICD-10-CM

## 2025-08-12 PROCEDURE — 77063 BREAST TOMOSYNTHESIS BI: CPT | Performed by: STUDENT IN AN ORGANIZED HEALTH CARE EDUCATION/TRAINING PROGRAM

## 2025-08-12 PROCEDURE — 77067 SCR MAMMO BI INCL CAD: CPT | Performed by: STUDENT IN AN ORGANIZED HEALTH CARE EDUCATION/TRAINING PROGRAM

## 2025-08-12 PROCEDURE — 77067 SCR MAMMO BI INCL CAD: CPT

## 2025-08-21 ENCOUNTER — APPOINTMENT (OUTPATIENT)
Dept: RADIOLOGY | Facility: CLINIC | Age: 70
End: 2025-08-21
Payer: COMMERCIAL

## 2025-08-21 LAB — NONINV COLON CA DNA+OCC BLD SCRN STL QL: NEGATIVE

## 2026-07-28 ENCOUNTER — APPOINTMENT (OUTPATIENT)
Dept: PRIMARY CARE | Facility: CLINIC | Age: 71
End: 2026-07-28
Payer: COMMERCIAL